# Patient Record
Sex: FEMALE | Race: WHITE | Employment: FULL TIME | URBAN - METROPOLITAN AREA
[De-identification: names, ages, dates, MRNs, and addresses within clinical notes are randomized per-mention and may not be internally consistent; named-entity substitution may affect disease eponyms.]

---

## 2022-06-17 ENCOUNTER — APPOINTMENT (OUTPATIENT)
Dept: GENERAL RADIOLOGY | Age: 32
End: 2022-06-17
Attending: EMERGENCY MEDICINE
Payer: COMMERCIAL

## 2022-06-17 ENCOUNTER — HOSPITAL ENCOUNTER (EMERGENCY)
Age: 32
Discharge: HOME | End: 2022-06-18
Attending: EMERGENCY MEDICINE | Admitting: EMERGENCY MEDICINE
Payer: COMMERCIAL

## 2022-06-17 VITALS
OXYGEN SATURATION: 97 % | RESPIRATION RATE: 18 BRPM | HEIGHT: 64 IN | SYSTOLIC BLOOD PRESSURE: 116 MMHG | WEIGHT: 132 LBS | DIASTOLIC BLOOD PRESSURE: 82 MMHG | TEMPERATURE: 98 F | BODY MASS INDEX: 22.53 KG/M2 | HEART RATE: 81 BPM

## 2022-06-17 DIAGNOSIS — S61.212A LACERATION OF RIGHT MIDDLE FINGER WITHOUT FOREIGN BODY WITHOUT DAMAGE TO NAIL, INITIAL ENCOUNTER: Primary | ICD-10-CM

## 2022-06-17 PROCEDURE — 74011250636 HC RX REV CODE- 250/636: Performed by: EMERGENCY MEDICINE

## 2022-06-17 PROCEDURE — 90471 IMMUNIZATION ADMIN: CPT

## 2022-06-17 PROCEDURE — 99284 EMERGENCY DEPT VISIT MOD MDM: CPT

## 2022-06-17 PROCEDURE — 75810000293 HC SIMP/SUPERF WND  RPR

## 2022-06-17 PROCEDURE — 74011000250 HC RX REV CODE- 250: Performed by: EMERGENCY MEDICINE

## 2022-06-17 PROCEDURE — 90715 TDAP VACCINE 7 YRS/> IM: CPT | Performed by: EMERGENCY MEDICINE

## 2022-06-17 PROCEDURE — 73140 X-RAY EXAM OF FINGER(S): CPT

## 2022-06-17 RX ORDER — LIDOCAINE HYDROCHLORIDE 10 MG/ML
10 INJECTION INFILTRATION; PERINEURAL ONCE
Status: COMPLETED | OUTPATIENT
Start: 2022-06-17 | End: 2022-06-17

## 2022-06-17 RX ORDER — ESLICARBAZEPINE ACETATE 800 MG/1
800 TABLET ORAL DAILY
COMMUNITY

## 2022-06-17 RX ADMIN — LIDOCAINE HYDROCHLORIDE 10 ML: 10 INJECTION, SOLUTION INFILTRATION; PERINEURAL at 22:04

## 2022-06-17 RX ADMIN — TETANUS TOXOID, REDUCED DIPHTHERIA TOXOID AND ACELLULAR PERTUSSIS VACCINE, ADSORBED 0.5 ML: 5; 2.5; 8; 8; 2.5 SUSPENSION INTRAMUSCULAR at 22:05

## 2022-06-18 NOTE — DISCHARGE INSTRUCTIONS
You can go to any ER or urgent care to have your sutures removed in 7 days. Please keep your sutures is clean and dry as possible. Return with significant pain, redness spreading up your finger or hand, fever, or pus draining from your wound.

## 2022-06-18 NOTE — ED PROVIDER NOTES
HPI   78-year-old female with a past medical history of epilepsy presents to the emergency department due to a finger laceration. Patient was taking dishes out of her . She picked up a knife and dropped it. The knife was falling towards her foot so she grabbed a knife and sustained a laceration to her right third finger. Her boyfriend put some rapid seal on the finger which stopped bleeding. She denies any significant pain at this time. She is unsure when her last tetanus shot was. Otherwise she says she is in her normal state of health. Past Medical History:   Diagnosis Date    Epilepsy [facility])        History reviewed. No pertinent surgical history. History reviewed. No pertinent family history. Social History     Socioeconomic History    Marital status: SINGLE     Spouse name: Not on file    Number of children: Not on file    Years of education: Not on file    Highest education level: Not on file   Occupational History    Not on file   Tobacco Use    Smoking status: Never Smoker    Smokeless tobacco: Never Used   Substance and Sexual Activity    Alcohol use: Never    Drug use: Never    Sexual activity: Not on file   Other Topics Concern    Not on file   Social History Narrative    Not on file     Social Determinants of Health     Financial Resource Strain:     Difficulty of Paying Living Expenses: Not on file   Food Insecurity:     Worried About Running Out of Food in the Last Year: Not on file    Luis of Food in the Last Year: Not on file   Transportation Needs:     Lack of Transportation (Medical): Not on file    Lack of Transportation (Non-Medical):  Not on file   Physical Activity:     Days of Exercise per Week: Not on file    Minutes of Exercise per Session: Not on file   Stress:     Feeling of Stress : Not on file   Social Connections:     Frequency of Communication with Friends and Family: Not on file    Frequency of Social Gatherings with Friends and Family: Not on file    Attends Shinto Services: Not on file    Active Member of Clubs or Organizations: Not on file    Attends Club or Organization Meetings: Not on file    Marital Status: Not on file   Intimate Partner Violence:     Fear of Current or Ex-Partner: Not on file    Emotionally Abused: Not on file    Physically Abused: Not on file    Sexually Abused: Not on file   Housing Stability:     Unable to Pay for Housing in the Last Year: Not on file    Number of [place] in the Last Year: Not on file    Unstable Housing in the Last Year: Not on file         ALLERGIES: Patient has no known allergies. Review of Systems   A complete review of systems was performed and all systems reviewed are negative unless otherwise documented in the HPI    Vitals:    06/17/22 2144   BP: 128/88   Pulse: 81   Resp: 16   Temp: 98 °F (36.7 °C)   SpO2: 100%   Weight: 59.9 kg (132 lb)   Height: 5' 4\" (1.626 m)            Physical Exam  Constitutional:       Comments: Resting comfortably in no acute distress   Neck:      Comments: Trachea midline  Cardiovascular:      Comments: Rate and rhythm. Brisk capillary refill in the distal aspect of the right third finger  Pulmonary:      Effort: No respiratory distress. Musculoskeletal:      Cervical back: Normal range of motion. Comments: No gross deformity. Normal range of motion of the right third finger   Skin:     General: Skin is warm and dry. Comments: Approximately 2 cm laceration on the radial aspect of the right third finger distal to the PIP joint with no active bleeding. Neurological:      Comments: Awake and alert. Speech is normal.  GCS 15          MDM   26-year-old female presents with the above chief complaint. She has a laceration with no active bleeding to the radial aspect of the right third finger distal to the PIP joint. No evidence of a tendon injury on exam.  X-ray will be obtained.   Tetanus shot will be ordered and laceration will need repair. X-ray shows no acute fracture. Laceration repaired and patient discharged in stable condition. Wound Repair    Date/Time: 6/17/2022 11:54 PM  Performed by: attendingPreparation: skin prepped with Betadine  Location: Right middle finger. Wound length:2.5 cm or less  Anesthesia: digital block    Anesthesia:  Local Anesthetic: lidocaine 1% without epinephrine  Anesthetic total (ml): 4. Foreign bodies: no foreign bodies  Irrigation solution: tap water  Irrigation method: tap  Wound skin closure material used: 5-0 Ethilon. Number of sutures: 4  Technique: simple  Approximation: close  Dressing: non-adhesive packing strip  Patient tolerance: patient tolerated the procedure well with no immediate complications  My total time at bedside, performing this procedure was 1-15 minutes.

## 2022-06-18 NOTE — ED TRIAGE NOTES
Triage note:unloading the  and cut right 3rd finger with a knife on accident has laceration put rapid seal on finger and it stopped bleeding.

## 2022-09-11 ENCOUNTER — HOSPITAL ENCOUNTER (EMERGENCY)
Age: 32
Discharge: HOME | End: 2022-09-11
Attending: STUDENT IN AN ORGANIZED HEALTH CARE EDUCATION/TRAINING PROGRAM
Payer: COMMERCIAL

## 2022-09-11 VITALS
WEIGHT: 125 LBS | BODY MASS INDEX: 21.34 KG/M2 | OXYGEN SATURATION: 98 % | TEMPERATURE: 98.7 F | HEIGHT: 64 IN | DIASTOLIC BLOOD PRESSURE: 80 MMHG | SYSTOLIC BLOOD PRESSURE: 108 MMHG | RESPIRATION RATE: 15 BRPM | HEART RATE: 88 BPM

## 2022-09-11 DIAGNOSIS — G40.909 SEIZURE DISORDER (HCC): Primary | ICD-10-CM

## 2022-09-11 DIAGNOSIS — G40.909 RECURRENT SEIZURES (HCC): ICD-10-CM

## 2022-09-11 LAB
GLUCOSE BLD STRIP.AUTO-MCNC: 89 MG/DL (ref 65–117)
SERVICE CMNT-IMP: NORMAL

## 2022-09-11 PROCEDURE — 99283 EMERGENCY DEPT VISIT LOW MDM: CPT

## 2022-09-11 PROCEDURE — 82962 GLUCOSE BLOOD TEST: CPT

## 2022-09-11 RX ORDER — ESLICARBAZEPINE ACETATE 800 MG/1
800 TABLET ORAL DAILY
Qty: 30 TABLET | Refills: 2 | Status: SHIPPED | OUTPATIENT
Start: 2022-09-11 | End: 2022-12-10

## 2022-09-11 RX ORDER — LEVETIRACETAM 500 MG/1
TABLET ORAL 2 TIMES DAILY
COMMUNITY

## 2022-09-11 NOTE — DISCHARGE INSTRUCTIONS
You presented to the ED with recurrence of her seizures. He had been taking Aptiom with good results but this was discontinued by her insurance company replaced back on [address]. You have been seizure-free for 3 years but now had recurrence after being on Keppra for 1 to 2 months. Prescription for Aptiom was written. Information for neurology provided. Please contact them for close follow-up. Attempt to contact the insurance company to get approval for your medication. No other identifying features of recurrent seizures noted. No history of recent infections. Attempt to reduce things which lowers seizure threshold such as stress, poor sleep, poor diet.

## 2022-09-11 NOTE — ED TRIAGE NOTES
Patient arrives ambulatory to the ED. States she had a seizure about 2-3 hours ago. Per the patient's boyfriend, it lasted about 3 minutes. She has a hx of seizures. States she recently had a medication change.

## 2022-09-11 NOTE — ED PROVIDER NOTES
Patient is a 68-year-old female with history of seizure disorder had been on multiple medications in the past and was recently on Aptiom with good results and no seizures for 3 years. Patient states that her insurance company stopped providing coverage for Aptiom and switched her back to Keppra 500 mg twice daily. This happened about 1 to 2 months ago. Patient had a 3-minute seizure today that was observed by her significant other. Patient had some mild amnesia after the event as well as tremor. Symptoms are completely resolved at this time in the ED. Patient is endorsing migraine headache. Patient offered IV headache medications but she is taking Motrin and says it is fine patient does not currently have a neurologist but her medications are being prescribed by her primary care. Although patient states Aptiom is expensive she would like to restart this medication as it had kept her seizure-free for 3 years. Blood glucose stable. The history is provided by the patient, medical records and a significant other. Seizure   This is a chronic problem. The current episode started less than 1 hour ago. The problem has been resolved. There was 1 seizure. The most recent episode lasted 2 to 5 minutes. Associated symptoms include headaches. Pertinent negatives include no confusion, no sore throat, no chest pain, no cough, no vomiting and no diarrhea. Characteristics include rhythmic jerking and loss of consciousness. The episode was Witnessed. There was The sensation of an aura present. There was return to baseline postseizure. The seizures Did not continue in the ED. The seizure(s) had no focality. Possible causes include medication or dosage change. There has been no fever. She reports No chest pain, no confusion, no diarrhea, no vomiting, headaches, no sore throat, no cough. There were no medications administered prior to arrival. Home seizure medications include: Keppra.      Past Medical History:   Diagnosis Date    Epilepsy [facility])        History reviewed. No pertinent surgical history. History reviewed. No pertinent family history. Social History     Socioeconomic History    Marital status: SINGLE     Spouse name: Not on file    Number of children: Not on file    Years of education: Not on file    Highest education level: Not on file   Occupational History    Not on file   Tobacco Use    Smoking status: Never    Smokeless tobacco: Never   Substance and Sexual Activity    Alcohol use: Never    Drug use: Never    Sexual activity: Not on file   Other Topics Concern    Not on file   Social History Narrative    Not on file     Social Determinants of Health     Financial Resource Strain: Not on file   Food Insecurity: Not on file   Transportation Needs: Not on file   Physical Activity: Not on file   Stress: Not on file   Social Connections: Not on file   Intimate Partner Violence: Not on file   Housing Stability: Not on file         ALLERGIES: Patient has no known allergies. Review of Systems   Constitutional: Negative. HENT: Negative. Negative for sore throat. Eyes: Negative. Respiratory: Negative. Negative for cough. Cardiovascular: Negative. Negative for chest pain. Gastrointestinal: Negative. Negative for diarrhea and vomiting. Endocrine: Negative. Genitourinary: Negative. Musculoskeletal: Negative. Skin: Negative. Allergic/Immunologic: Negative. Neurological:  Positive for seizures, loss of consciousness and headaches. Hematological: Negative. Psychiatric/Behavioral: Negative. Negative for confusion. Vitals:    09/11/22 1118 09/11/22 1130   BP: 128/89 108/80   Pulse: 88    Resp: 16 15   Temp: 98.7 °F (37.1 °C)    SpO2: 97% 98%   Weight: 56.7 kg (125 lb)    Height: 5' 4\" (1.626 m)             Physical Exam  Vitals and nursing note reviewed. Constitutional:       General: She is not in acute distress. Appearance: Normal appearance.    HENT:      Head: Normocephalic and atraumatic. Right Ear: External ear normal.      Left Ear: External ear normal.      Nose: Nose normal.   Eyes:      Extraocular Movements: Extraocular movements intact. Conjunctiva/sclera: Conjunctivae normal.   Cardiovascular:      Rate and Rhythm: Normal rate. Pulses: Normal pulses. Radial pulses are 2+ on the right side and 2+ on the left side. Heart sounds: Normal heart sounds. Pulmonary:      Effort: Pulmonary effort is normal.      Breath sounds: Normal breath sounds. Chest:      Chest wall: No deformity or tenderness. Abdominal:      General: Abdomen is flat. There is no distension. Tenderness: There is no abdominal tenderness. Musculoskeletal:         General: No deformity or signs of injury. Normal range of motion. Cervical back: Normal range of motion and neck supple. No tenderness. Skin:     General: Skin is warm and dry. Capillary Refill: Capillary refill takes less than 2 seconds. Neurological:      General: No focal deficit present. Mental Status: She is alert and oriented to person, place, and time. Mental status is at baseline. Motor: No weakness. Gait: Gait normal.   Psychiatric:         Attention and Perception: Attention normal.         Mood and Affect: Mood normal.         Behavior: Behavior normal.        MDM         LABORATORY RESULTS:  Labs Reviewed   GLUCOSE, POC       MEDICATIONS GIVEN:  Medications - No data to display    Differential diagnosis: Seizure disorder, medication change, infection, hypoglycemia    ED physician interpretation of laboratory results: Glucose within normal limits    MDM: Patient is a 75-year-old female with a seizure disorder that has been resistant to multiple medications who has been well controlled on Aptiom over the last 3 years but recently had medication change back to [facility] due to insurance reasons.   Patient was weaned off Aptiom appropriately but has had her first seizure in 3 years. I suspect this is due to Her not controlling her seizures. Prescription for Aptiom at patient's request which is appropriate as this medication is worked in the past.  We will start at her 800 milligrams which is a normal starting dose for this medication the patient is already tolerated. Patient is back to baseline and denies any preceding infectious symptoms. Stress and other factors may be in play but most likely this is due to returning to a medication which has not been fully controlling her seizures in the past.  Information for neurology provided for follow-up. Patient has a follow-up appointment next May but patient is attempting to find other avenues as well. Further personalized recommendations for outpatient care as below. Key discharge instructions and summary of care: You presented to the ED with recurrence of her seizures. He had been taking Aptiom with good results but this was discontinued by her insurance company replaced back on [address]. You have been seizure-free for 3 years but now had recurrence after being on Keppra for 1 to 2 months. Prescription for Aptiom was written. Information for neurology provided. Please contact them for close follow-up. Attempt to contact the insurance company to get approval for your medication. No other identifying features of recurrent seizures noted. No history of recent infections. Attempt to reduce things which lowers seizure threshold such as stress, poor sleep, poor diet. Patient is stable for discharge. All available radiology and laboratory results have been reviewed with patient and/or available family.   Patient and/or family verbally conveyed their understanding and agreement of the patient's signs, symptoms, diagnosis, treatment and prognosis and additionally agree to follow-up as recommended in the discharge instructions or to return to the Emergency Department should their condition change or worsen prior to their follow-up appointment. All questions have been answered and patient and/or available family express understanding. IMPRESSION:  1. Seizure disorder ([address]    2. Recurrent seizures ([facility] [address]        DISPOSITION: Discharged    Yash Cotton MD      Procedures

## 2022-09-11 NOTE — ED NOTES
Discharge instructions reviewed with patient. Discharge instructions given to pt per Dr. Alexandra Kincaid. Patient able to return/verbalize discharge instructions. Copy of discharge instructions given. Pt condition stable, respiratory status within normal limits, neuro status intact. Ambulated out of ER, accompanied by her boyfriend.